# Patient Record
Sex: MALE | URBAN - NONMETROPOLITAN AREA
[De-identification: names, ages, dates, MRNs, and addresses within clinical notes are randomized per-mention and may not be internally consistent; named-entity substitution may affect disease eponyms.]

---

## 2024-08-15 ENCOUNTER — TELEPHONE (OUTPATIENT)
Dept: NEUROSURGERY | Age: 59
End: 2024-08-15

## 2024-08-15 NOTE — TELEPHONE ENCOUNTER
1st attempt to contact patient. I WAS UNABLE TO LEAVE a voicemail instructing patient to call back at [phone] to schedule their new patient appointment     NUMBER UNAVAILABLE    Muscle weakness (generalized)  M54.16 (ICD-10-CM) - Radiculopathy, lumbar region  M99.03 (ICD-10-CM) - Segmental and somatic dysfunction of lumbar region  M62.830 (ICD-10-CM) - Muscle spasm of back